# Patient Record
Sex: FEMALE | Race: BLACK OR AFRICAN AMERICAN | NOT HISPANIC OR LATINO | Employment: OTHER | URBAN - METROPOLITAN AREA
[De-identification: names, ages, dates, MRNs, and addresses within clinical notes are randomized per-mention and may not be internally consistent; named-entity substitution may affect disease eponyms.]

---

## 2017-05-11 ENCOUNTER — OFFICE VISIT (OUTPATIENT)
Dept: OBSTETRICS AND GYNECOLOGY | Facility: CLINIC | Age: 62
End: 2017-05-11
Payer: COMMERCIAL

## 2017-05-11 VITALS
WEIGHT: 181.69 LBS | HEIGHT: 64 IN | SYSTOLIC BLOOD PRESSURE: 199 MMHG | BODY MASS INDEX: 31.02 KG/M2 | DIASTOLIC BLOOD PRESSURE: 96 MMHG

## 2017-05-11 DIAGNOSIS — N76.0 BACTERIAL VAGINOSIS: ICD-10-CM

## 2017-05-11 DIAGNOSIS — N95.1 SYMPTOMATIC MENOPAUSAL OR FEMALE CLIMACTERIC STATES: ICD-10-CM

## 2017-05-11 DIAGNOSIS — B96.89 BACTERIAL VAGINOSIS: ICD-10-CM

## 2017-05-11 DIAGNOSIS — Z72.51 HIGH RISK HETEROSEXUAL BEHAVIOR: ICD-10-CM

## 2017-05-11 DIAGNOSIS — Z01.419 ENCOUNTER FOR GYNECOLOGICAL EXAMINATION (GENERAL) (ROUTINE) WITHOUT ABNORMAL FINDINGS: Primary | ICD-10-CM

## 2017-05-11 PROCEDURE — 99203 OFFICE O/P NEW LOW 30 MIN: CPT | Mod: PBBFAC,PN | Performed by: OBSTETRICS & GYNECOLOGY

## 2017-05-11 PROCEDURE — 99396 PREV VISIT EST AGE 40-64: CPT | Mod: S$GLB,,, | Performed by: OBSTETRICS & GYNECOLOGY

## 2017-05-11 PROCEDURE — 87480 CANDIDA DNA DIR PROBE: CPT

## 2017-05-11 PROCEDURE — 99999 PR PBB SHADOW E&M-NEW PATIENT-LVL III: CPT | Mod: PBBFAC,,, | Performed by: OBSTETRICS & GYNECOLOGY

## 2017-05-11 RX ORDER — ESTRADIOL 1 MG/1
2 TABLET ORAL DAILY
Qty: 30 TABLET | Refills: 11 | Status: SHIPPED | OUTPATIENT
Start: 2017-05-11 | End: 2018-07-06 | Stop reason: SDUPTHER

## 2017-05-11 RX ORDER — LOSARTAN POTASSIUM AND HYDROCHLOROTHIAZIDE 12.5; 1 MG/1; MG/1
TABLET ORAL
Refills: 1 | COMMUNITY
Start: 2017-04-27 | End: 2020-05-12 | Stop reason: SDUPTHER

## 2017-05-11 RX ORDER — ASPIRIN 325 MG
325 TABLET ORAL
COMMUNITY
End: 2020-06-08

## 2017-05-11 RX ORDER — ESTRADIOL 1 MG/1
TABLET ORAL
Refills: 2 | COMMUNITY
Start: 2017-03-10 | End: 2017-05-11 | Stop reason: SDUPTHER

## 2017-05-11 RX ORDER — OXYBUTYNIN CHLORIDE 5 MG/1
TABLET, EXTENDED RELEASE ORAL
Refills: 2 | COMMUNITY
Start: 2017-03-10 | End: 2017-09-06 | Stop reason: SDUPTHER

## 2017-05-11 RX ORDER — MELOXICAM 15 MG/1
TABLET ORAL
Refills: 2 | COMMUNITY
Start: 2017-03-10 | End: 2020-05-12

## 2017-05-11 NOTE — MR AVS SNAPSHOT
[facility]  [address] [place] [address] [address]  [facility] [id number]  Phone: [phone]                  Remedios Niño   2017 10:30 AM   Office Visit    Description:  Female : 1955   Provider:  Ruth Griffin MD   Department:  [facility]           Reason for Visit     Annual Exam           Diagnoses this Visit        Comments    Bacterial vaginosis    -  Primary     High risk heterosexual behavior         Symptomatic menopausal or female climacteric states                To Do List           Future Appointments        Provider Department Dept Phone    2017 11:40 AM LABORATORY, [facility] (5 Years of Data)     None       These Medications        Disp Refills Start End    estradiol (ESTRACE) 1 MG tablet 30 tablet 11 2017 6/10/2017    Take 2 tablets (2 mg total) by mouth once daily. - Oral    Pharmacy: [address] #: [phone]         [facility]     [facility] Nurse Care Line -  Assistance  Unless otherwise directed by your provider, please contact [facility], our nurse care line that is available for  assistance.     Registered nurses in the [facility] provide: appointment scheduling, clinical advisement, health education, and other advisory services.  Call: [phone] (toll free)               Medications           START taking these NEW medications        Refills    estradiol (ESTRACE) 1 MG tablet 11    Sig: Take 2 tablets (2 mg total) by mouth once daily.    Class: Normal    Route: Oral           Verify that the below list of medications is an accurate representation of the medications you are currently taking.  If none reported, the list may be blank. If incorrect, please contact your healthcare provider. Carry this list with you in case of emergency.           Current Medications     aspirin 325 MG  "tablet Take 325 mg by mouth.    estradiol (ESTRACE) 1 MG tablet Take 2 tablets (2 mg total) by mouth once daily.    losartan-hydrochlorothiazide 100-12.5 mg (HYZAAR) 100-12.5 mg Tab     meloxicam (MOBIC) 15 MG tablet     oxybutynin (DITROPAN-XL) 5 MG TR24            Clinical Reference Information           Your Vitals Were     BP Height Weight BMI       199/96 5' 4" (1.626 m) 82.4 kg (181 lb 10.5 oz) 31.18 kg/m2       Blood Pressure          Most Recent Value    BP  (!)  199/96      Allergies as of 5/11/2017     No Known Allergies      Immunizations Administered on Date of Encounter - 5/11/2017     None      Orders Placed During Today's Visit      Normal Orders This Visit    Vaginosis Screen by DNA Probe     Future Labs/Procedures Expected by Expires    Herpes simplex type 1&2 IgG,Herpes titer  5/11/2017 5/11/2018    HIV-1 and HIV-2 antibodies  5/11/2017 7/10/2018    RPR  5/11/2017 7/10/2018      [facility] Sign-Up     Activating your [facility] account is as easy as 1-2-3!     1) Visit [email or web address], select Sign Up Now, enter this activation code and your date of birth, then select Next.  [email or web address]  Expires: 6/25/2017 11:19 AM      2) Create a username and password to use when you visit [facility] in the future and select a security question in case you lose your password and select Next.    3) Enter your e-mail address and click Sign Up!    Additional Information  If you have questions, please e-mail [email or web address] or call [phone] to talk to our [facility] staff. Remember, [facility] is NOT to be used for urgent needs. For medical emergencies, dial 911.         Language Assistance Services     ATTENTION: Language assistance services are available, free of charge. Please call [phone].      ATENCIÓN: Si habla español, tiene a couch disposición servicios gratuitos de asistencia lingüística. Llame al [phone].     CHÚ Ý: N?u b?n nói Ti?ng Vi?t, có các d?ch v? h? tr? ngôn ng? mi?n phí armond parikh " b?n. G?i s? [phone].         [facility] complies with applicable Federal civil rights laws and does not discriminate on the basis of race, color, national origin, age, disability, or sex.

## 2017-05-11 NOTE — PROGRESS NOTES
Subjective:       Patient ID: Remedios Niño is a 61 y.o. female.    Chief Complaint:  Annual Exam      History of Present Illness  HPI  Annual Exam-Postmenopausal  Patient presents for annual exam. The patient c/o lower pelvic pains; thinks she may have bladder infection coming. The patient is sexually active--last intercourse 6 months. GYN screening history: last mammo 2017--doing every 6 months. The patient is taking estrace; but still with hot flushes. Patient denies post-menopausal vaginal bleeding. The patient wears seatbelts: yes. The patient participates in regular exercise: yes --starting to get back into schedule since back surgery. Has the patient ever been transfused or tattooed?: yes. The patient reports that there is not domestic violence in her life.    Tolerable hot flushes, night sweats but feels she is still happening  Hx guy for fibroids years ; nml pap hx  Leakage better since use of ditropan  Considering new relationship     GYN & OB History  No LMP recorded. Patient has had a hysterectomy.   Date of Last Pap: No result found    OB History    Para Term  AB SAB TAB Ectopic Multiple Living   4 4        4      # Outcome Date GA Lbr Terry/2nd Weight Sex Delivery Anes PTL Lv   4 Para            3 Para            2 Para            1 Para                   Review of Systems  Review of Systems   Constitutional: Negative for activity change, appetite change, chills, diaphoresis, fatigue, fever and unexpected weight change.   HENT: Negative for mouth sores and tinnitus.    Eyes: Negative for discharge and visual disturbance.   Respiratory: Negative for cough, shortness of breath and wheezing.    Cardiovascular: Negative for chest pain, palpitations and leg swelling.   Gastrointestinal: Negative for abdominal pain, bloating, blood in stool, constipation, diarrhea, nausea and vomiting.   Endocrine: Positive for hot flashes. Negative for diabetes, hair loss, hyperthyroidism and  hypothyroidism.   Genitourinary: Negative for decreased libido, dyspareunia, dysuria, flank pain, frequency, genital sores, hematuria, menorrhagia, menstrual problem, pelvic pain, urgency, vaginal bleeding, vaginal discharge, vaginal pain, dysmenorrhea, urinary incontinence, postcoital bleeding, postmenopausal bleeding and vaginal odor.   Musculoskeletal: Negative for back pain and myalgias.   Skin:  Negative for rash, no acne and hair changes.   Neurological: Negative for seizures, syncope, numbness and headaches.   Hematological: Negative for adenopathy. Does not bruise/bleed easily.   Psychiatric/Behavioral: Negative for depression and sleep disturbance. The patient is not nervous/anxious.    Breast: Negative for breast mass, breast pain, nipple discharge and skin changes          Objective:    Physical Exam:   Constitutional: She appears well-developed.     Eyes: Conjunctivae and EOM are normal. Pupils are equal, round, and reactive to light.    Neck: Normal range of motion. Neck supple.     Pulmonary/Chest: Effort normal. Right breast exhibits no mass, no nipple discharge, no skin change and no tenderness. Left breast exhibits no mass, no nipple discharge, no skin change and no tenderness. Breasts are symmetrical.        Abdominal: Soft.     Genitourinary: Rectum normal. Pelvic exam was performed with patient supine. Right adnexum displays no mass and no tenderness. Left adnexum displays no mass and no tenderness. No erythema, bleeding, rectocele, cystocele or unspecified prolapse of vaginal walls in the vagina. Vaginal discharge found. Labial bartholins normal.       Uterus Size: 6 cm   Musculoskeletal: Normal range of motion.       Neurological: She is alert.    Skin: Skin is warm.    Psychiatric: She has a normal mood and affect.          Assessment:     Encounter Diagnoses   Name Primary?    Bacterial vaginosis     High risk heterosexual behavior     Symptomatic menopausal or female climacteric states      Encounter for gynecological examination (general) (routine) without abnormal findings Yes                 Plan:      Continue annual well woman exam.  Pap not needed due to hx guy  mammo current  Pt to ck on colonoscopy schedule  Continue diet, exercise, weight loss  Wants higher dose of estrace, rx sent  Affirm testing today  Hiv/rpr/herpes testing today  reveiwed safe sex practices

## 2017-05-12 ENCOUNTER — LAB VISIT (OUTPATIENT)
Dept: LAB | Facility: HOSPITAL | Age: 62
End: 2017-05-12
Attending: OBSTETRICS & GYNECOLOGY
Payer: COMMERCIAL

## 2017-05-12 DIAGNOSIS — Z72.51 HIGH RISK HETEROSEXUAL BEHAVIOR: ICD-10-CM

## 2017-05-12 LAB
CANDIDA RRNA VAG QL PROBE: POSITIVE
G VAGINALIS RRNA GENITAL QL PROBE: NEGATIVE
T VAGINALIS RRNA GENITAL QL PROBE: NEGATIVE

## 2017-05-12 PROCEDURE — 36415 COLL VENOUS BLD VENIPUNCTURE: CPT | Mod: PO

## 2017-05-12 PROCEDURE — 86695 HERPES SIMPLEX TYPE 1 TEST: CPT

## 2017-05-12 PROCEDURE — 86703 HIV-1/HIV-2 1 RESULT ANTBDY: CPT

## 2017-05-12 PROCEDURE — 86592 SYPHILIS TEST NON-TREP QUAL: CPT

## 2017-05-13 ENCOUNTER — TELEPHONE (OUTPATIENT)
Dept: OBSTETRICS AND GYNECOLOGY | Facility: HOSPITAL | Age: 62
End: 2017-05-13

## 2017-05-13 DIAGNOSIS — B37.31 YEAST VAGINITIS: Primary | ICD-10-CM

## 2017-05-13 LAB — RPR SER QL: NORMAL

## 2017-05-13 RX ORDER — FLUCONAZOLE 200 MG/1
200 TABLET ORAL DAILY
Qty: 3 TABLET | Refills: 0 | Status: SHIPPED | OUTPATIENT
Start: 2017-05-13 | End: 2017-05-16

## 2017-05-15 ENCOUNTER — TELEPHONE (OUTPATIENT)
Dept: OBSTETRICS AND GYNECOLOGY | Facility: CLINIC | Age: 62
End: 2017-05-15

## 2017-05-15 LAB — HIV 1+2 AB+HIV1 P24 AG SERPL QL IA: NEGATIVE

## 2017-05-15 NOTE — TELEPHONE ENCOUNTER
----- Message from Ruth Griffin MD sent at 5/13/2017 11:46 AM CDT -----  Please advise vaginal culture showed yeast, a rx was sent

## 2017-05-16 LAB
HSV1 IGG SERPL QL IA: POSITIVE
HSV2 IGG SERPL QL IA: POSITIVE

## 2017-06-08 ENCOUNTER — TELEPHONE (OUTPATIENT)
Dept: OBSTETRICS AND GYNECOLOGY | Facility: CLINIC | Age: 62
End: 2017-06-08

## 2017-06-08 NOTE — TELEPHONE ENCOUNTER
----- Message from Ruth Griffin MD sent at 5/17/2017  8:27 PM CDT -----  Please advise pt that hiv and rpr tests are negative; but her labs show she has been chronically exposed to herpes

## 2017-06-09 ENCOUNTER — TELEPHONE (OUTPATIENT)
Dept: OBSTETRICS AND GYNECOLOGY | Facility: CLINIC | Age: 62
End: 2017-06-09

## 2017-06-09 NOTE — TELEPHONE ENCOUNTER
Patient wondering if she needs to come in to see about anything (HSV)..  Patient advised that unless she is having outbreak issues, she does not have to be seen.  Patient denies problems and verbalized understanding.

## 2017-06-09 NOTE — TELEPHONE ENCOUNTER
----- Message from Leydi Bledsoe sent at 6/9/2017  1:17 PM CDT -----  Contact: pt  States she spoke with the nurse the other day regarding blood work, she has question. Please call pt at [phone]. Thank you

## 2017-09-06 RX ORDER — OXYBUTYNIN CHLORIDE 5 MG/1
TABLET, EXTENDED RELEASE ORAL
Qty: 30 TABLET | Refills: 2 | Status: SHIPPED | OUTPATIENT
Start: 2017-09-06 | End: 2018-03-08 | Stop reason: SDUPTHER

## 2017-09-06 NOTE — TELEPHONE ENCOUNTER
----- Message from [place] sent at 9/6/2017  1:46 PM CDT -----  1. What is the name of the medication you are requesting? oxybutynin  2. What is the dose?5mg  3. How do you take the medication? Orally, topically, etc? oral  4. How often do you take this medication? 1/day  5. Do you need a 30 day or 90 day supply? 30  6. How many refills are you requesting? 0  7. What is your preferred pharmacy and location of the pharmacy? .  [facility] [address] [address]  Phone: [phone] Fax: [phone]    8. Who can we contact with further questions? [phone]

## 2017-09-08 ENCOUNTER — TELEPHONE (OUTPATIENT)
Dept: OBSTETRICS AND GYNECOLOGY | Facility: CLINIC | Age: 62
End: 2017-09-08

## 2017-09-08 NOTE — TELEPHONE ENCOUNTER
----- Message from Margo Anderson sent at 9/8/2017  9:58 AM CDT -----  Contact: Pt  ..1. What is the name of the medication you are requesting? Oxybutynine   2. What is the dose? 5 mgs   3. How do you take the medication? Orally, topically, etc? orally  4. How often do you take this medication?  Once a day   5. Do you need a 30 day or 90 day supply? 30 day   6. How many refills are you requesting? 1  7. What is your preferred pharmacy and location of the pharmacy?     ..  [address] [address]  Phone: [phone] Fax: [phone]      8. Who can we contact with further questions? Pt at [phone] please notify when sent

## 2018-02-28 ENCOUNTER — TELEPHONE (OUTPATIENT)
Dept: OBSTETRICS AND GYNECOLOGY | Facility: CLINIC | Age: 63
End: 2018-02-28

## 2018-02-28 NOTE — TELEPHONE ENCOUNTER
Left messages for the pt. to call back. I need the name of the rx the pt. would like to get refilled. suzanne allison    Pt. Is requesting a refill on her medication but did not give the name of the rx. suzanne Allison

## 2018-03-09 RX ORDER — OXYBUTYNIN CHLORIDE 5 MG/1
TABLET, EXTENDED RELEASE ORAL
Qty: 30 TABLET | Refills: 2 | Status: SHIPPED | OUTPATIENT
Start: 2018-03-09 | End: 2018-06-19 | Stop reason: SDUPTHER

## 2018-03-09 NOTE — TELEPHONE ENCOUNTER
Left another message for a Pt to call back and state the name of the medication she is requesting. SONNY

## 2018-03-09 NOTE — TELEPHONE ENCOUNTER
----- Message from Lizzie Barry sent at 3/9/2018  2:06 PM CST -----  Contact: pt  Calling in about RX and want it before the weekend and please contact pt once it has been sent and have been waiting on it for a week  and please advise [phone]

## 2018-03-10 RX ORDER — OXYBUTYNIN CHLORIDE 5 MG/1
TABLET, EXTENDED RELEASE ORAL
Qty: 30 TABLET | Refills: 1 | OUTPATIENT
Start: 2018-03-10

## 2018-06-12 RX ORDER — OXYBUTYNIN CHLORIDE 5 MG/1
TABLET, EXTENDED RELEASE ORAL
Qty: 30 TABLET | Refills: 2 | OUTPATIENT
Start: 2018-06-12

## 2018-06-19 RX ORDER — OXYBUTYNIN CHLORIDE 5 MG/1
TABLET, EXTENDED RELEASE ORAL
Qty: 30 TABLET | Refills: 2 | Status: SHIPPED | OUTPATIENT
Start: 2018-06-19 | End: 2020-05-12

## 2018-07-06 DIAGNOSIS — N95.1 SYMPTOMATIC MENOPAUSAL OR FEMALE CLIMACTERIC STATES: ICD-10-CM

## 2018-07-08 RX ORDER — ESTRADIOL 2 MG/1
2 TABLET ORAL DAILY
Qty: 30 TABLET | Refills: 0 | Status: SHIPPED | OUTPATIENT
Start: 2018-07-08 | End: 2020-05-12

## 2020-05-12 PROBLEM — S46.011A ROTATOR CUFF STRAIN, RIGHT, INITIAL ENCOUNTER: Status: ACTIVE | Noted: 2019-01-09

## 2020-05-12 PROBLEM — M16.11 PRIMARY OSTEOARTHRITIS OF RIGHT HIP: Status: ACTIVE | Noted: 2017-11-21

## 2024-01-22 ENCOUNTER — TELEPHONE (OUTPATIENT)
Dept: PSYCHIATRY | Facility: CLINIC | Age: 69
End: 2024-01-22
Payer: COMMERCIAL

## 2024-01-24 ENCOUNTER — OFFICE VISIT (OUTPATIENT)
Dept: PSYCHIATRY | Facility: CLINIC | Age: 69
End: 2024-01-24
Payer: COMMERCIAL

## 2024-01-24 VITALS — BODY MASS INDEX: 18.58 KG/M2 | WEIGHT: 118.63 LBS

## 2024-01-24 DIAGNOSIS — F41.9 ANXIETY: ICD-10-CM

## 2024-01-24 DIAGNOSIS — F32.89 OTHER DEPRESSION: ICD-10-CM

## 2024-01-24 DIAGNOSIS — F32.1 CURRENT MODERATE EPISODE OF MAJOR DEPRESSIVE DISORDER, UNSPECIFIED WHETHER RECURRENT: Primary | ICD-10-CM

## 2024-01-24 PROCEDURE — 90792 PSYCH DIAG EVAL W/MED SRVCS: CPT | Mod: S$GLB,,, | Performed by: PSYCHIATRY & NEUROLOGY

## 2024-01-24 PROCEDURE — 99999 PR PBB SHADOW E&M-EST. PATIENT-LVL II: CPT | Mod: PBBFAC,,, | Performed by: PSYCHIATRY & NEUROLOGY

## 2024-01-24 RX ORDER — MIRTAZAPINE 15 MG/1
TABLET, FILM COATED ORAL
Qty: 30 TABLET | Refills: 3 | Status: SHIPPED | OUTPATIENT
Start: 2024-01-24 | End: 2024-04-25 | Stop reason: SDUPTHER

## 2024-01-24 NOTE — PROGRESS NOTES
Outpatient Psychiatry Initial Visit (MD/NP)    2024    Mickie Niño, a 68 y.o. female, presenting for initial evaluation visit. Met with patient.    Reason for Encounter: Patient complains of depression    History of Present Illness: Ms. Niño is a 68 year old woman who presents for establishment of care, reports problems for approximately the past four years in context of worth health and losses, financial problems. Tends to retreat to bed in context of low moods, decreases activity.     Reports having started duloxetine about one month ago, prescribed through pain management but hasn't noticed a benefit.     Has been taking quetiapine for past five or six months for sleep, prescribed by her primary care physician. Says that it was helping a great deal, less over time. Endorses passive SI. No active intent or plan. Barriers - affects to kids.   Low appetite, losing weight. No AVH  No delusions.     Back pain, knee pain, foot pain.     Psychiatric Hx:   Family Hx: denies   No inna  No avh  No self-harm  No psych hospitalizations.     Past Medical History:   Diagnosis Date    CVA (cerebral vascular accident)     Hemorrhoid     Hyperlipemia     Hypertension     Hyperthyroidism     Osteoarthritis      Social Hx: born in [place]. Full-term, no developmental complications. Grew up with mom. Dad wasn't in her life. 8th of 9 kids, 5 of them with her father. Childhood was happy. No mistreatment. Last completed 11th grade, quit during 12th grade.  x 1, at 21. Split up >10 years ago. He  in . Didn't work until they split up. 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 Back surgery and neck surgery - wasn't able to continue working. Now getting disability. [facility] lives with patient in [place]. On good terms with her kids as well as her siblings. Watching TV, doing chores. Retreats to bed.     No alcohol. Uses medical [employer] (helps modestly).  No illicits. Takes prescribed hydrocodone, never more than prescribed. No abuse or diversion.     Review Of Systems:     GENERAL:  No weight gain or loss  SKIN:  No rashes or lacerations  HEAD:  No headaches  EYES:  No exophthalmos, jaundice or blindness  EARS:  No dizziness, tinnitus or hearing loss  NOSE:  No changes in smell  MOUTH & THROAT:  No dyskinetic movements or obvious goiter  CHEST:  No shortness of breath, hyperventilation or cough  CARDIOVASCULAR:  No tachycardia or chest pain  ABDOMEN:  No nausea, vomiting, pain, constipation or diarrhea  URINARY:  No frequency, dysuria or sexual dysfunction  ENDOCRINE:  No polydipsia, polyuria  MUSCULOSKELETAL:  No pain or stiffness of the joints  NEUROLOGIC:  No weakness, sensory changes, seizures, confusion, memory loss, tremor or other abnormal movements    Current Evaluation:     Nutritional Screening: Considering the patient's height and weight, medications, medical history and preferences, should a referral be made to the dietitian? no    Constitutional  Vitals:  Most recent vital signs, dated less than 90 days prior to this appointment, were reviewed.       General:  unremarkable, age appropriate     Musculoskeletal  Muscle Strength/Tone:  no tremor, no tic   Gait & Station:  non-ataxic     Psychiatric  Appearance: casually dressed & groomed;   Behavior: calm,   Cooperation: cooperative with assessment  Speech: normal rate, volume, tone  Thought Process: linear, goal-directed  Thought Content: No suicidal or homicidal ideation; no delusions  Affect: depressed  Mood: depressed  Perceptions: No auditory or visual hallucinations  Level of Consciousness: alert throughout interview  Insight: fair  Cognition: Oriented to person, place, time, & situation  Memory: no apparent deficits to general clinical interview; not formally assessed  Attention/Concentration: no apparent deficits to general clinical interview; not formally assessed  Fund of Knowledge: average by  vocabulary/education    Laboratory Data  No visits with results within 1 Month(s) from this visit.   Latest known visit with results is:   Office Visit on 11/30/2023   Component Date Value Ref Range Status    SARS Coronavirus 2 Antigen 11/30/2023 Negative  Negative Final     Acceptable 11/30/2023 Yes   Final    Rapid Influenza A Ag 11/30/2023 Negative  Negative Final    Rapid Influenza B Ag 11/30/2023 Negative  Negative Final     Acceptable 11/30/2023 Yes   Final     Medications  Outpatient Encounter Medications as of 1/24/2024   Medication Sig Dispense Refill    ALLERGY RELIEF, LORATADINE, 10 mg tablet TAKE 1 TABLET (10 MG TOTAL) BY MOUTH ONCE DAILY. 30 tablet 5    amLODIPine (NORVASC) 10 MG tablet TAKE 1 TABLET (10 MG TOTAL) BY MOUTH ONCE DAILY. 90 tablet 1    amoxicillin (AMOXIL) 875 MG tablet Take 1 tablet (875 mg total) by mouth 2 (two) times daily. 20 tablet 0    atorvastatin (LIPITOR) 40 MG tablet Take 1 tablet (40 mg total) by mouth once daily. 90 tablet 3    baclofen (LIORESAL) 10 MG tablet TAKE 1 TABLET BY MOUTH TWICE A DAY [ TAKE BACLOFEN DURING DAY AND TAKE TIZANIDINE AT BEDTIME ]      cetirizine (ZYRTEC) 10 MG tablet Take 1 tablet (10 mg total) by mouth once daily. 30 tablet 5    cloNIDine (CATAPRES) 0.1 MG tablet Take 1 tablet by mouth once daily.      cyanocobalamin 1,000 mcg/mL injection INJECT 1ML UNDER THE SKIN (SQ) DAILY X 3 DAYS THEN WEEKLY X 3 WEEKS THEN MONTHLY      diclofenac sodium (VOLTAREN) 1 % Gel APPLY 2 GRAM TO SKIN FOUR TIMES A DAY AS NEEDED FOR PAIN      dicyclomine (BENTYL) 10 MG capsule Take 10 mg by mouth 3 (three) times daily.      DULoxetine (CYMBALTA) 60 MG capsule Take 60 mg by mouth every morning.      estradioL (ESTRACE) 1 MG tablet Take 1 mg by mouth once daily.      gabapentin (NEURONTIN) 100 MG capsule TAKE 1 CAPSULE BY MOUTH THREE TIMES A DAY MAY CAUSE DROWSINESS      hydroCHLOROthiazide (MICROZIDE) 12.5 mg capsule Take 1 capsule by mouth once  daily.      HYDROcodone-acetaminophen (NORCO)  mg per tablet TAKE 1 TABLET BY MOUTH EVERY 6 HOURS WHEN NEEDED FOR PAIN      LUMIGAN 0.01 % Drop       meloxicam (MOBIC) 15 MG tablet TAKE 1 TABLET BY MOUTH ONCE A DAY WITH MEALS      methocarbamoL (ROBAXIN) 500 MG Tab TAKE 1-2 TABLETS BY MOUTH EVERY 6-8 HOURS AS NEEDED; TAKE AS NEEDED FOR BACK/MUSCLE PAIN. WILL CAUSE DROWSINESS.      mupirocin (BACTROBAN) 2 % ointment APPLY TOPICALLY ONCE DAILY. 22 g 0    ondansetron (ZOFRAN-ODT) 8 MG TbDL PLACE 1 TABLET UNDER THE TONGUE EVERY 8 HOURS AS NEEDED FOR NAUSEA / VOMITING.      oxybutynin (DITROPAN-XL) 10 MG 24 hr tablet Take 10 mg by mouth once daily.      pantoprazole (PROTONIX) 40 MG tablet Take 1 tablet (40 mg total) by mouth once daily. 30 tablet 1    pregabalin (LYRICA) 75 MG capsule Take 75 mg by mouth 2 (two) times daily.      QUEtiapine (SEROQUEL) 50 MG tablet TAKE 1 TABLET (50 MG TOTAL) BY MOUTH EVERY EVENING. 30 tablet 2    sucralfate (CARAFATE) 1 gram tablet TAKE 1 TABLET BY MOUTH 4 TIMES A DAY 30 MINUTES PRIOR TO MEALS AND PRIOR TO BEDTIME      tiZANidine (ZANAFLEX) 4 MG tablet TAKE 1 TABLET BY MOUTH AT BEDTIME AS NEEDED FOR PAIN      triamcinolone acetonide 0.1% (KENALOG) 0.1 % cream Apply topically 2 (two) times daily. Do not use for longer than 2 weeks. 80 g 0    [DISCONTINUED] mupirocin (BACTROBAN) 2 % ointment Apply topically once daily. 30 g 0     No facility-administered encounter medications on file as of 1/24/2024.     Assessment - Diagnosis - Goals:     Impression: 68 year old woman with several years of depression, recent trial of duloxetine without apparent benefit. Quetiapine has been helpful for sleep.     Mdd, recurrent, moderate    Treatment Goals:  Specify outcomes written in observable, behavioral terms:   Reduce depression by self-report    Treatment Plan/Recommendations:   Mirtazapine trial.  Discussed risks, benefits, and alternatives to treatment plan documented above with patient. I  answered all patient questions related to this plan and patient expressed understanding and agreement.     Return to Clinic: 2-3 months    Counseling time: 10 minutes  Total time: 50 minutes    ASHLEY Hoff MD  Psychiatry, [facility]

## 2024-03-08 PROBLEM — E05.90 HYPERTHYROIDISM: Status: ACTIVE | Noted: 2023-04-04

## 2024-04-23 ENCOUNTER — PATIENT MESSAGE (OUTPATIENT)
Dept: PSYCHIATRY | Facility: CLINIC | Age: 69
End: 2024-04-23
Payer: COMMERCIAL

## 2024-04-25 ENCOUNTER — OFFICE VISIT (OUTPATIENT)
Dept: PSYCHIATRY | Facility: CLINIC | Age: 69
End: 2024-04-25
Payer: COMMERCIAL

## 2024-04-25 VITALS
HEART RATE: 68 BPM | SYSTOLIC BLOOD PRESSURE: 144 MMHG | DIASTOLIC BLOOD PRESSURE: 76 MMHG | WEIGHT: 137.81 LBS | BODY MASS INDEX: 21.58 KG/M2

## 2024-04-25 DIAGNOSIS — F33.41 MDD (MAJOR DEPRESSIVE DISORDER), RECURRENT, IN PARTIAL REMISSION: Primary | ICD-10-CM

## 2024-04-25 PROCEDURE — 3077F SYST BP >= 140 MM HG: CPT | Mod: CPTII,S$GLB,, | Performed by: PSYCHIATRY & NEUROLOGY

## 2024-04-25 PROCEDURE — 3078F DIAST BP <80 MM HG: CPT | Mod: CPTII,S$GLB,, | Performed by: PSYCHIATRY & NEUROLOGY

## 2024-04-25 PROCEDURE — 3008F BODY MASS INDEX DOCD: CPT | Mod: CPTII,S$GLB,, | Performed by: PSYCHIATRY & NEUROLOGY

## 2024-04-25 PROCEDURE — 99214 OFFICE O/P EST MOD 30 MIN: CPT | Mod: S$GLB,,, | Performed by: PSYCHIATRY & NEUROLOGY

## 2024-04-25 PROCEDURE — 99999 PR PBB SHADOW E&M-EST. PATIENT-LVL II: CPT | Mod: PBBFAC,,, | Performed by: PSYCHIATRY & NEUROLOGY

## 2024-04-25 RX ORDER — MIRTAZAPINE 15 MG/1
TABLET, FILM COATED ORAL
Qty: 90 TABLET | Refills: 1 | Status: SHIPPED | OUTPATIENT
Start: 2024-04-25

## 2024-04-25 NOTE — PROGRESS NOTES
Outpatient Psychiatry Follow-up Visit (MD/NP)    2024    Mickie Niño, a 68 y.o. female, presenting for follow-up visit. Met with patient.    Reason for Encounter: Patient complains of depression    Interval History: Patient seen and interviewed for follow-up, about three months since her last visit. She reports moods are more often euthymic, feeling calmer, in better spirits. Eating more, better appetite. Also describes a general increase in activity, and more motivation. Adherent to medication. Describes periods of rare dizziness at night. Cyst removal & minor gum surgery. Family is ok. No upcoming anticipated stressors.      Background: Ms. Niño is a 67 y/o woman who presents for establishment of care, reports problems for approximately the past four years in context of worth health and losses, financial problems. Tends to retreat to bed in context of low moods, decreases activity.     Reports having started duloxetine about one month ago, prescribed through pain management but hasn't noticed a benefit.     Has been taking quetiapine for past five or six months for sleep, prescribed by her primary care physician. Says that it was helping a great deal, less over time. Endorses passive SI. No active intent or plan. Barriers - affects to kids.   Low appetite, losing weight. No AVH  No delusions.     Back pain, knee pain, foot pain.     Psychiatric Hx:   Family Hx: denies   No inna  No avh  No self-harm  No psych hospitalizations.     Past Medical History:   Diagnosis Date    CVA (cerebral vascular accident)     Hemorrhoid     Hyperlipemia     Hypertension     Hyperthyroidism     Osteoarthritis      Social Hx: born in [place]. Full-term, no developmental complications. Grew up with mom. Dad wasn't in her life. 8th of 9 kids, 5 of them with her father. Childhood was happy. No mistreatment. Last completed 11th grade, quit during 12th grade.  x 1, at 21. Split up >10 years ago. He  in .  Didn't work until they split up. 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 Back surgery and neck surgery - wasn't able to continue working. Now getting disability. lives with daughter. Florence Jackson, [place]. 1 lives with patient in [place]. On good terms with her kids as well as her siblings. Watching TV, doing chores. Retreats to bed.     No alcohol. Uses medical [facility] (helps modestly). No illicits. Takes prescribed hydrocodone, never more than prescribed. No abuse or diversion.     Review Of Systems:     GENERAL:  No weight gain or loss  SKIN:  No rashes or lacerations  HEAD:  No headaches  EYES:  No exophthalmos, jaundice or blindness  EARS:  No dizziness, tinnitus or hearing loss  NOSE:  No changes in smell  MOUTH & THROAT:  No dyskinetic movements or obvious goiter  CHEST:  No shortness of breath, hyperventilation or cough  CARDIOVASCULAR:  No tachycardia or chest pain  ABDOMEN:  No nausea, vomiting, pain, constipation or diarrhea  URINARY:  No frequency, dysuria or sexual dysfunction  ENDOCRINE:  No polydipsia, polyuria  MUSCULOSKELETAL:  No pain or stiffness of the joints  NEUROLOGIC:  No weakness, sensory changes, seizures, confusion, memory loss, tremor or other abnormal movements    Current Evaluation:     Nutritional Screening: Considering the patient's height and weight, medications, medical history and preferences, should a referral be made to the dietitian? no    Constitutional  Vitals:  Most recent vital signs, dated less than 90 days prior to this appointment, were reviewed.       General:  unremarkable, age appropriate     Musculoskeletal  Muscle Strength/Tone:  no tremor, no tic   Gait & Station:  non-ataxic     Psychiatric  Appearance: casually dressed & groomed;   Behavior: calm,   Cooperation: cooperative with assessment  Speech: normal rate, volume, tone  Thought Process: linear, goal-directed  Thought Content: No suicidal or homicidal ideation; no delusions  Affect:  depressed  Mood: depressed  Perceptions: No auditory or visual hallucinations  Level of Consciousness: alert throughout interview  Insight: fair  Cognition: Oriented to person, place, time, & situation  Memory: no apparent deficits to general clinical interview; not formally assessed  Attention/Concentration: no apparent deficits to general clinical interview; not formally assessed  Fund of Knowledge: average by vocabulary/education    Laboratory Data  No visits with results within 1 Month(s) from this visit.   Latest known visit with results is:   Office Visit on 11/30/2023   Component Date Value Ref Range Status    SARS Coronavirus 2 Antigen 11/30/2023 Negative  Negative Final     Acceptable 11/30/2023 Yes   Final    Rapid Influenza A Ag 11/30/2023 Negative  Negative Final    Rapid Influenza B Ag 11/30/2023 Negative  Negative Final     Acceptable 11/30/2023 Yes   Final     Medications  Outpatient Encounter Medications as of 4/25/2024   Medication Sig Dispense Refill    ALLERGY RELIEF, LORATADINE, 10 mg tablet TAKE 1 TABLET (10 MG TOTAL) BY MOUTH ONCE DAILY. 30 tablet 5    amLODIPine (NORVASC) 10 MG tablet Take 1 tablet (10 mg total) by mouth once daily. 90 tablet 1    azelastine (ASTELIN) 137 mcg (0.1 %) nasal spray 1 spray (137 mcg total) by Nasal route 2 (two) times daily. 30 mL 0    baclofen (LIORESAL) 10 MG tablet TAKE 1 TABLET BY MOUTH TWICE A DAY [ TAKE BACLOFEN DURING DAY AND TAKE TIZANIDINE AT BEDTIME ]      cetirizine (ZYRTEC) 10 MG tablet Take 1 tablet (10 mg total) by mouth once daily. 30 tablet 5    dexAMETHasone (DECADRON) 1 MG Tab       diclofenac sodium (VOLTAREN) 1 % Gel APPLY 2 GRAM TO SKIN FOUR TIMES A DAY AS NEEDED FOR PAIN      DULoxetine (CYMBALTA) 30 MG capsule Take 30 mg by mouth every morning.      estradioL (ESTRACE) 1 MG tablet Take 1 mg by mouth once daily.      hydroCHLOROthiazide (HYDRODIURIL) 25 MG tablet Take 1 tablet (25 mg total) by mouth once daily.  30 tablet 0    HYDROcodone-acetaminophen (NORCO)  mg per tablet TAKE 1 TABLET BY MOUTH EVERY 6 HOURS WHEN NEEDED FOR PAIN      LUMIGAN 0.01 % Drop       megestroL (MEGACE) 20 MG Tab Take 20 mg by mouth 2 (two) times daily.      meloxicam (MOBIC) 15 MG tablet TAKE 1 TABLET BY MOUTH ONCE A DAY WITH MEALS      methocarbamoL (ROBAXIN) 500 MG Tab TAKE 1-2 TABLETS BY MOUTH EVERY 6-8 HOURS AS NEEDED; TAKE AS NEEDED FOR BACK/MUSCLE PAIN. WILL CAUSE DROWSINESS.      metoprolol succinate (TOPROL-XL) 25 MG 24 hr tablet TAKE 1 TABLET (25 MG TOTAL) BY MOUTH ONCE DAILY. 30 tablet 0    mirtazapine (REMERON) 15 MG tablet Take 1 tablet at bedtime. 90 tablet 1    mupirocin (BACTROBAN) 2 % ointment APPLY TOPICALLY ONCE DAILY. 22 g 0    ondansetron (ZOFRAN-ODT) 8 MG TbDL PLACE 1 TABLET UNDER THE TONGUE EVERY 8 HOURS AS NEEDED FOR NAUSEA / VOMITING.      oxybutynin (DITROPAN-XL) 10 MG 24 hr tablet Take 10 mg by mouth once daily.      pregabalin (LYRICA) 75 MG capsule Take 75 mg by mouth 2 (two) times daily.      QUEtiapine (SEROQUEL) 50 MG tablet TAKE 1 TABLET (50 MG TOTAL) BY MOUTH EVERY EVENING. 30 tablet 2    tiZANidine (ZANAFLEX) 4 MG tablet TAKE 1 TABLET BY MOUTH AT BEDTIME AS NEEDED FOR PAIN      triamcinolone acetonide 0.1% (KENALOG) 0.1 % cream Apply topically 2 (two) times daily. Do not use for longer than 2 weeks. 80 g 0    [DISCONTINUED] metoprolol succinate (TOPROL-XL) 25 MG 24 hr tablet Take 1 tablet (25 mg total) by mouth once daily. 30 tablet 0    [DISCONTINUED] mirtazapine (REMERON) 15 MG tablet Take 1/2 tablet at bedtime for 4 days then 1 tablet at bedtime thereafter. 30 tablet 3     No facility-administered encounter medications on file as of 4/25/2024.     Assessment - Diagnosis - Goals:     Impression: 68 year old woman with several years of depression, recent trial of duloxetine without apparent benefit. Quetiapine has been helpful for sleep.     Mdd, recurrent, moderate    Treatment Goals:  Specify outcomes  written in observable, behavioral terms:   Reduce depression by self-report    Treatment Plan/Recommendations:   Continue mirtazapine.   Discussed risks, benefits, and alternatives to treatment plan documented above with patient. I answered all patient questions related to this plan and patient expressed understanding and agreement.     Return to Clinic: 2-3 months    Counseling time: 10 minutes  Total time: 50 minutes    ASHLEY Hoff MD  Psychiatry, [facility]

## 2024-08-22 ENCOUNTER — TELEPHONE (OUTPATIENT)
Dept: PSYCHIATRY | Facility: CLINIC | Age: 69
End: 2024-08-22
Payer: COMMERCIAL

## 2024-09-18 ENCOUNTER — TELEPHONE (OUTPATIENT)
Dept: PSYCHIATRY | Facility: CLINIC | Age: 69
End: 2024-09-18
Payer: COMMERCIAL

## 2024-10-07 ENCOUNTER — TELEPHONE (OUTPATIENT)
Dept: PSYCHIATRY | Facility: CLINIC | Age: 69
End: 2024-10-07
Payer: COMMERCIAL

## 2024-11-11 RX ORDER — MIRTAZAPINE 15 MG/1
TABLET, FILM COATED ORAL
Qty: 90 TABLET | Refills: 0 | Status: SHIPPED | OUTPATIENT
Start: 2024-11-11